# Patient Record
Sex: MALE | Race: WHITE | NOT HISPANIC OR LATINO | Employment: FULL TIME | URBAN - METROPOLITAN AREA
[De-identification: names, ages, dates, MRNs, and addresses within clinical notes are randomized per-mention and may not be internally consistent; named-entity substitution may affect disease eponyms.]

---

## 2023-08-24 ENCOUNTER — HOSPITAL ENCOUNTER (EMERGENCY)
Facility: HOSPITAL | Age: 48
Discharge: HOME | End: 2023-08-24
Attending: EMERGENCY MEDICINE | Admitting: EMERGENCY MEDICINE
Payer: COMMERCIAL

## 2023-08-24 VITALS
TEMPERATURE: 97.8 F | WEIGHT: 197 LBS | RESPIRATION RATE: 19 BRPM | BODY MASS INDEX: 28.2 KG/M2 | HEART RATE: 69 BPM | DIASTOLIC BLOOD PRESSURE: 67 MMHG | OXYGEN SATURATION: 95 % | SYSTOLIC BLOOD PRESSURE: 108 MMHG | HEIGHT: 70 IN

## 2023-08-24 DIAGNOSIS — T18.108A FOREIGN BODY IN ESOPHAGUS, INITIAL ENCOUNTER: ICD-10-CM

## 2023-08-24 PROBLEM — E78.00 HYPERCHOLESTEROLEMIA: Status: ACTIVE | Noted: 2020-04-14

## 2023-08-24 PROBLEM — J30.9 ALLERGIC RHINITIS: Status: ACTIVE | Noted: 2021-04-19

## 2023-08-24 PROBLEM — E78.5 HYPERLIPIDEMIA, UNSPECIFIED: Status: ACTIVE | Noted: 2022-10-18

## 2023-08-24 PROBLEM — F41.9 ANXIETY: Status: ACTIVE | Noted: 2022-10-18

## 2023-08-24 PROBLEM — F51.01 INSOMNIA, IDIOPATHIC: Status: ACTIVE | Noted: 2022-10-18

## 2023-08-24 PROBLEM — I10 PRIMARY HYPERTENSION: Status: ACTIVE | Noted: 2022-10-18

## 2023-08-24 PROBLEM — J45.909 ASTHMA: Status: ACTIVE | Noted: 2021-04-19

## 2023-08-24 PROCEDURE — 250N000013 HC RX MED GY IP 250 OP 250 PS 637: Performed by: EMERGENCY MEDICINE

## 2023-08-24 PROCEDURE — 99283 EMERGENCY DEPT VISIT LOW MDM: CPT

## 2023-08-24 RX ADMIN — ANTACID/ANTIFLATULENT 4 G: 380; 550; 10; 10 GRANULE, EFFERVESCENT ORAL at 13:43

## 2023-08-24 NOTE — ED PROVIDER NOTES
EMERGENCY DEPARTMENT ENCOUNTER      NAME: Santiago Mejia  AGE: 48 year old male  YOB: 1975  MRN: [id number]  EVALUATION DATE & TIME: 8/24/2023  1:36 PM    PCP: No primary care provider on file.    ED PROVIDER: Rocío Jesus MD    Chief Complaint   Patient presents with    Swallowed Foreign Body         FINAL IMPRESSION:  1. Foreign body in esophagus, initial encounter          ED COURSE & MEDICAL DECISION MAKING:    Pertinent Labs & Imaging studies reviewed. (See chart for details)  48 year old male with history of asthma, HTN, HLD and allergic rhinitis who presents to the Emergency Department for evaluation of sensation of esophageal foreign body after he was eating lunch and believes he got either a piece of corn or chickpea stuck in his throat and has not been able to swallow since.  Patient points just above the level of the sternocleidal notch as area of discomfort.  Prefers to spit his saliva due to dysphagia.  Symptoms are concerning for esophageal foreign body versus abrasion.  Patient does not have any respiratory symptoms to suspect aspiration.    Patient initially seen evaluated by myself in triage area due to boarding crisis.  Patient was given EZ gas and instructed to jump up and down landing forcefully on his heels.  Patient got some relief with this.  He still feels ongoing irritation but was able to tolerate liquids and solids.  He got more relief after eating some peanut butter.  Safe for discharge to home.  Encouraged to follow-up with GI for ongoing symptoms, referral provided as well as referral for PCP.           1:40 PM I met with the patient, obtained history, performed an initial exam, and discussed options and plan for diagnostics and treatment here in the ED.     Medical Decision Making    History:  Supplemental history from: Documented in chart, if applicable  External Record(s) reviewed: Outpatient Record: 4/18/2023 family medicine visit    Work Up:  Chart documentation  "includes differential considered and any EKGs or imaging independently interpreted by provider, see MDM  In additional to work up documented, I considered the following work up: see MDM    External consultation:  Discussion of management with another provider: N/A    Complicating factors:  Care impacted by chronic illness: Chronic Lung Disease, Hyperlipidemia, and Hypertension  Care affected by social determinants of health: Access to Medical Care  No PCP    Disposition considerations: Discharge. No recommendations on prescription strength medication(s). See documentation for any additional details.        At the conclusion of the encounter I discussed the results of all of the tests and the disposition. The questions were answered. The patient or family acknowledged understanding and was agreeable with the care plan.      MEDICATIONS GIVEN IN THE EMERGENCY:  Medications   sod bicarbonate-citric acid-simethicone (EZ GAS) 2.21-1.53-0.04 g packet 4 g (4 g Oral $Given 8/24/23 7966)       NEW PRESCRIPTIONS STARTED AT TODAY'S ER VISIT  New Prescriptions    No medications on file          =================================================================    HPI    Patient information was obtained from: the patient    Use of Intrepreter: N/A    Santiago Mejia is a 48 year old male with pertinent medical history of hyperlipidemia, hypertension and asthma who presents after swallowing a foreign body. Per the patient's nurse, the patient believes that he may have swallowed a piece of corn of a chickpea at around 12:30 PM today, 08/23, that later became stuck in his throat.     Per the patient, he has not noticed any vocal or breathing changes. He reports that swallowing causes his throat to become \"constricted\", so he hasn't had anything to eat or drink since the incident. He notes that he has been spitting out his saliva.    Per Chart Review, the patient was seen on 04/18/23 at [facility] for yearly " "preventative physical exam. The patient reported having insomnia and upper back pain. Patient's blood pressure was within normal range. Patient needed a Tdap vaccine. The patient's Zocor for hyperlipidemia was refilled and his lipid panel was checked. Patient was given 50 mg tablets of Trazodone to take at bedtime for insomnia.     PAST MEDICAL HISTORY:  History reviewed. No pertinent past medical history.    PAST SURGICAL HISTORY:  History reviewed. No pertinent surgical history.    CURRENT MEDICATIONS:    None       ALLERGIES:  Allergies   Allergen Reactions    Amoxicillin Rash       FAMILY HISTORY:  History reviewed. No pertinent family history.    SOCIAL HISTORY:        VITALS:  Patient Vitals for the past 24 hrs:   BP Temp Temp src Pulse Resp SpO2 Height Weight   08/24/23 1430 111/70 -- -- 69 -- 95 % -- --   08/24/23 1413 117/75 -- -- 74 -- 98 % -- --   08/24/23 1334 123/89 97.8  F (36.6  C) Oral 78 19 96 % 1.778 m (5' 10\") 89.4 kg (197 lb)       PHYSICAL EXAM    General Appearance: Well-appearing, well-nourished, no acute distress   ENT:  Lips, mucosa, and tongue normal; membranes are moist without pallor. Patient points to level of hyoid, is spitting out secretions for comfort.   Neck:  Supple, symmetrical, trachea midline, no adenopathy  Cardio:  Regular rate and rhythm, no murmur/gallop/rub  Pulm:  No respiratory distress, clear to auscultation bilaterally  Abdomen:  Soft, non-tender, non distended,no rebound or guarding.  Extremities: Normal gait  Neuro:  Alert and oriented ×3      The creation of this record is based on the scribe s observations of the work being performed by Rocío Jesus MD and the provider s statements to them. It was created on her behalf by Milla Hong, a trained medical scribe. This document has been checked and approved by the attending provider.    Rocío Jesus MD  Emergency Medicine  [facility] EMERGENCY DEPARTMENT  [address] " [facility] [id number]  [phone]  Dept: [phone]     Rocío Jesus MD  08/24/23 1502

## 2023-08-24 NOTE — ED TRIAGE NOTES
At 1230 was eating lunch and felt either a corn of chickpea got suck in his throat.  Spitting his saliva